# Patient Record
Sex: MALE | Race: WHITE | Employment: FULL TIME | URBAN - METROPOLITAN AREA
[De-identification: names, ages, dates, MRNs, and addresses within clinical notes are randomized per-mention and may not be internally consistent; named-entity substitution may affect disease eponyms.]

---

## 2017-10-30 NOTE — PROGRESS NOTES
"  SUBJECTIVE:   CC: Iván Chirinos is an 31 year old male who presents for preventative health visit.     Healthy Habits:    Do you get at least three servings of calcium containing foods daily (dairy, green leafy vegetables, etc.)? yes and no, taking calcium and/or vitamin D supplement: no    Amount of exercise or daily activities, outside of work: Was going to the gym 3 day(s) per week, has not been doing this for the last few months.    Problems taking medications regularly not applicable    Medication side effects: No    Have you had an eye exam in the past two years? yes    Do you see a dentist twice per year? Has not seen a dentist for \"a while\"    Do you have sleep apnea, excessive snoring or daytime drowsiness? Yes, has a CPAP, has had a sleep study done.           - Patient has concerns with constipation and having blood in his stools. Patient has googled the causes of blood in the stool. Now is having anxiety about possible cancer. Denies having a Fhx of colon cancer.     - Onset beginning of Oct. Patient had first bout of constipation. He does not drink a lot of water daily. He had some bloody stools twice since them.       Today's PHQ-2 Score: PHQ-2 ( 1999 Pfizer) 5/26/2015   Q1: Little interest or pleasure in doing things 0   Q2: Feeling down, depressed or hopeless 0   PHQ-2 Score 0       Abuse: Current or Past(Physical, Sexual or Emotional)- No  Do you feel safe in your environment - Yes    Social History   Substance Use Topics     Smoking status: Never Smoker     Smokeless tobacco: Never Used     Alcohol use Yes          Standardized Alcohol Screening Questionnaire  AUDIT   Questions 0 1 2 3 4 Score   1. How often do you have a drink  containing alcohol? Never Monthly or less 2 to 4  times a  month 2 to 3  times a  week 4 or more  times a  week  4   2. How many drinks containing alcohol  do you have on a typical day when you are drinking? 1 or 2 3 or 4 5 or 6 7 to 9 10 or more  2   3. How often do you " have more than five  or more drinks on one occasion? Never Less  than  monthly Monthly Weekly Daily or  almost  daily  3   4. How often during the last year have  you found that you were not able to stop drinking once you had started? Never Less  than  monthly Monthly Weekly Daily or  almost  daily  0   5. How often during the last year have  you failed to do what was normally expected of you because of drinking? Never Less  than  monthly Monthly Weekly Daily or  almost  daily  0   6. How often during the last year have  you needed a first drink in the morning to get yourself going after a heavy drinking session? Never Less  than  monthly Monthly Weekly Daily or  almost  daily  0   7. How often during the last year have you had a feeling of guilt or remorse after drinking? Never Less  than  monthly Monthly Weekly Daily or  almost  daily  0   8. How often during the last year have  you been unable to remember what happened the night before because of your drinking? Never Less  than  monthly Monthly Weekly Daily or  almost  daily  0   9. Have you or someone else been  injured because of your drinking? No  Yes, but not in the last year  Yes,  during the  last year  0   10. Has a relative, friend, doctor or other health care worker been concerned about your drinking or suggested you cut down? No  Yes, but not in the last year  Yes,  during the  last year  0   Total  9   Scoring: A score of 7 for adult men is an indication of hazardous drinking (risk for physical or physiological harm); a score of 8 or more is an indication of an alcohol use disorder. A score of 5 or more for adult women  is an indication of hazardous drinking or an alcohol use disorder.         Last PSA: No results found for: PSA    Reviewed orders with patient. Reviewed health maintenance and updated orders accordingly - Yes      Reviewed and updated as needed this visit by clinical staff         Reviewed and updated as needed this visit by  "Provider          - Patient uses CPAP machine nightly with no concerns.       ROS:  C: NEGATIVE for fever, chills, change in weight  I: NEGATIVE for worrisome rashes, moles or lesions  E: NEGATIVE for vision changes or irritation  ENT: NEGATIVE for ear, mouth and throat problems  R: NEGATIVE for significant cough or SOB  CV: NEGATIVE for chest pain, palpitations or peripheral edema  GI: POSITIVE for constipation. NEGATIVE for nausea, abdominal pain, heartburn   male: NEGATIVE for dysuria, hematuria, decreased urinary stream, erectile dysfunction, urethral discharge  M: NEGATIVE for significant arthralgias or myalgia  N: NEGATIVE for weakness, dizziness or paresthesias  P: NEGATIVE for changes in mood or affect    This document serves as a record of the services and decisions personally performed and made by Mahsa Beckett MD. It was created on his behalf by Jesus Kumar, a trained medical scribe. The creation of this document is based the provider's statements to the medical scribe.  Jesus Kumar 8:46 AM November 1, 2017  OBJECTIVE:   BP (!) 162/96  Pulse 88  Temp 98.9  F (37.2  C) (Tympanic)  Ht 6' 0.75\" (1.848 m)  Wt (!) 340 lb (154.2 kg)  BMI 45.17 kg/m2       EXAM:  GENERAL: healthy, alert and no distress  EYES: Eyes grossly normal to inspection, conjunctivae and sclerae normal  RESP: lungs clear to auscultation - no rales, rhonchi or wheezes  CV: regular rate and rhythm, normal S1 S2, no murmur  MS: no gross musculoskeletal defects noted, no edema  NEURO: Normal strength and tone, mentation intact and speech normal  PSYCH: mentation appears normal, affect normal/bright      ASSESSMENT/PLAN:     (Z00.00) Routine general medical examination at a health care facility  (primary encounter diagnosis)  Comment: Reviewed lifestyle, current conditions, medications, routine screenings, labs.  Plan: Annual physical in 1 year, sooner for other health maintenance.     (Z13.1) Screening for diabetes " mellitus  Comment: Routine screen, results pending.   Plan: Glucose          (Z13.6) Screening for cardiovascular condition  Comment: Routine screen, results pending.   Plan: Lipid panel reflex to direct LDL Fasting          (E66.01) Morbid obesity (H)  Comment: Discussed benefits of diet and regular exercise.   Plan: Continue to monitor    (G47.33) DESIRAE (obstructive sleep apnea)  Comment: Patient is using CPAP machine nightly. Will check labs, results pending.    Plan: CBC with platelets, TSH with free T4 reflex      (R03.0) Elevated blood pressure reading without diagnosis of hypertension  Comment: lifestyle changes recommended.   Plan: recheck in 3 months.         Patient Instructions     Preventive Health Recommendations  Male Ages 26 - 39      *   Work on regular meals, less carbs, and less processed food, less alcohol.     *   The rectal is okay, drink more water.     *    Use the CPAP consistently.     *    Follow up in 3 months for a weight check.     *    Will check cholesterol, blood sugar, thyroid, and hemoglobin.             COUNSELING:  Reviewed preventive health counseling, as reflected in patient instructions       Regular exercise       Healthy diet/nutrition    BP Screening:   Last 3 BP Readings:    BP Readings from Last 3 Encounters:   11/01/17 (!) 162/96   05/26/15 124/80       The following was recommended to the patient:  Re-screen BP within a year and recommended lifestyle modifications       reports that he has never smoked. He has never used smokeless tobacco.      Estimated body mass index is 42.31 kg/(m^2) as calculated from the following:    Height as of 5/26/15: 6' (1.829 m).    Weight as of 5/26/15: 312 lb (141.5 kg).   Weight management plan: Discussed healthy diet and exercise guidelines and patient will follow up in 12 months in clinic to re-evaluate.    Counseling Resources:  ATP IV Guidelines  Pooled Cohorts Equation Calculator  FRAX Risk Assessment  ICSI Preventive  Guidelines  Dietary Guidelines for Americans, 2010  USDA's MyPlate  ASA Prophylaxis  Lung CA Screening    The information in this document, created by a scribe for me, accurately reflects the services I personally performed and the decisions made by me. I have reviewed and approved this document for accuracy. 8:46 AM 11/1/2017    Mahsa Beckett MD  [facility]  Injectable Influenza Immunization Documentation    1.  Is the person to be vaccinated sick today?   No    2. Does the person to be vaccinated have an allergy to a component   of the vaccine?   No  Egg Allergy Algorithm Link    3. Has the person to be vaccinated ever had a serious reaction   to influenza vaccine in the past?   No    4. Has the person to be vaccinated ever had Guillain-Barré syndrome?   No    Form completed by Sally Rivera CMA

## 2017-10-30 NOTE — PATIENT INSTRUCTIONS
Preventive Health Recommendations  Male Ages 26 - 39      *   Work on regular meals, less carbs, and less processed food, less alcohol.     *   The rectal is okay, drink more water.     *    Use the CPAP consistently.     *    Follow up in 3 months for a weight check.     *    Will check cholesterol, blood sugar, thyroid, and hemoglobin.       Yearly exam:             See your health care provider every year in order to  o   Review health changes.   o   Discuss preventive care.    o   Review your medicines if your doctor has prescribed any.    You should be tested each year for STDs (sexually transmitted diseases), if you re at risk.     After age 35, talk to your provider about cholesterol testing. If you are at risk for heart disease, have your cholesterol tested at least every 5 years.     If you are at risk for diabetes, you should have a diabetes test (fasting glucose).  Shots: Get a flu shot each year. Get a tetanus shot every 10 years.     Nutrition:    Eat at least 5 servings of fruits and vegetables daily.     Eat whole-grain bread, whole-wheat pasta and brown rice instead of white grains and rice.     Talk to your provider about Calcium and Vitamin D.     Lifestyle    Exercise for at least 150 minutes a week (30 minutes a day, 5 days a week). This will help you control your weight and prevent disease.     Limit alcohol to one drink per day.     No smoking.     Wear sunscreen to prevent skin cancer.     See your dentist every six months for an exam and cleaning.

## 2017-11-01 ENCOUNTER — OFFICE VISIT (OUTPATIENT)
Dept: FAMILY MEDICINE | Facility: CLINIC | Age: 32
End: 2017-11-01
Payer: COMMERCIAL

## 2017-11-01 VITALS
WEIGHT: 315 LBS | DIASTOLIC BLOOD PRESSURE: 100 MMHG | HEIGHT: 73 IN | BODY MASS INDEX: 41.75 KG/M2 | TEMPERATURE: 98.9 F | SYSTOLIC BLOOD PRESSURE: 146 MMHG | HEART RATE: 88 BPM

## 2017-11-01 DIAGNOSIS — R03.0 ELEVATED BLOOD PRESSURE READING WITHOUT DIAGNOSIS OF HYPERTENSION: ICD-10-CM

## 2017-11-01 DIAGNOSIS — Z00.00 ROUTINE GENERAL MEDICAL EXAMINATION AT A HEALTH CARE FACILITY: Primary | ICD-10-CM

## 2017-11-01 DIAGNOSIS — Z13.6 SCREENING FOR CARDIOVASCULAR CONDITION: ICD-10-CM

## 2017-11-01 DIAGNOSIS — Z13.1 SCREENING FOR DIABETES MELLITUS: ICD-10-CM

## 2017-11-01 DIAGNOSIS — G47.33 OSA (OBSTRUCTIVE SLEEP APNEA): ICD-10-CM

## 2017-11-01 DIAGNOSIS — Z23 NEED FOR PROPHYLACTIC VACCINATION AND INOCULATION AGAINST INFLUENZA: ICD-10-CM

## 2017-11-01 DIAGNOSIS — E66.01 MORBID OBESITY (H): ICD-10-CM

## 2017-11-01 LAB
CHOLEST SERPL-MCNC: 178 MG/DL
ERYTHROCYTE [DISTWIDTH] IN BLOOD BY AUTOMATED COUNT: 13 % (ref 10–15)
GLUCOSE SERPL-MCNC: 110 MG/DL (ref 70–99)
HCT VFR BLD AUTO: 46.1 % (ref 40–53)
HDLC SERPL-MCNC: 66 MG/DL
HGB BLD-MCNC: 15.6 G/DL (ref 13.3–17.7)
LDLC SERPL CALC-MCNC: 95 MG/DL
MCH RBC QN AUTO: 31.5 PG (ref 26.5–33)
MCHC RBC AUTO-ENTMCNC: 33.8 G/DL (ref 31.5–36.5)
MCV RBC AUTO: 93 FL (ref 78–100)
NONHDLC SERPL-MCNC: 112 MG/DL
PLATELET # BLD AUTO: 295 10E9/L (ref 150–450)
RBC # BLD AUTO: 4.96 10E12/L (ref 4.4–5.9)
TRIGL SERPL-MCNC: 83 MG/DL
TSH SERPL DL<=0.005 MIU/L-ACNC: 0.8 MU/L (ref 0.4–4)
WBC # BLD AUTO: 9.2 10E9/L (ref 4–11)

## 2017-11-01 PROCEDURE — 84443 ASSAY THYROID STIM HORMONE: CPT | Performed by: FAMILY MEDICINE

## 2017-11-01 PROCEDURE — 90471 IMMUNIZATION ADMIN: CPT | Performed by: FAMILY MEDICINE

## 2017-11-01 PROCEDURE — 80061 LIPID PANEL: CPT | Performed by: FAMILY MEDICINE

## 2017-11-01 PROCEDURE — 90686 IIV4 VACC NO PRSV 0.5 ML IM: CPT | Performed by: FAMILY MEDICINE

## 2017-11-01 PROCEDURE — 99395 PREV VISIT EST AGE 18-39: CPT | Mod: 25 | Performed by: FAMILY MEDICINE

## 2017-11-01 PROCEDURE — 85027 COMPLETE CBC AUTOMATED: CPT | Performed by: FAMILY MEDICINE

## 2017-11-01 PROCEDURE — 82947 ASSAY GLUCOSE BLOOD QUANT: CPT | Performed by: FAMILY MEDICINE

## 2017-11-01 PROCEDURE — 36415 COLL VENOUS BLD VENIPUNCTURE: CPT | Performed by: FAMILY MEDICINE

## 2017-11-01 NOTE — LETTER
[facility]  [address] [address]  [phone]       November 2, 2017       Iván Chirinos    [address] [place] [address]      Dear Iván:    Your blood tests were essentially normal. Please see enclosed copies. They show that you have normal thyroid and kidney function. Your cholesterol is acceptable and there is no signs of anemia or infection.    The only concerning test was the fasting blood sugar; it was slightly elevated at 110. At this mild elevation; it's usually not diabetes but a pre diabetes condition that can be reversed. The same healthy life style habits that help with blood pressure, weight and cholesterol management, help lower your blood sugars: exercise, a low fat, low sugar diet, and portion control. We should recheck your fasting blood sugar levels yearly.    Please call with any questions or concerns.                      Sincerely,        Mahsa Beckett MD

## 2017-11-01 NOTE — NURSING NOTE
"Chief Complaint   Patient presents with     Physical       Initial BP (!) 162/96  Pulse 88  Temp 98.9  F (37.2  C) (Tympanic)  Ht 6' 0.75\" (1.848 m)  Wt (!) 340 lb (154.2 kg)  BMI 45.17 kg/m2 Estimated body mass index is 45.17 kg/(m^2) as calculated from the following:    Height as of this encounter: 6' 0.75\" (1.848 m).    Weight as of this encounter: 340 lb (154.2 kg).  Medication Reconciliation: complete     SMA Monica      "

## 2017-11-01 NOTE — MR AVS SNAPSHOT
After Visit Summary   11/1/2017    Iván Chirinos    MRN: [id number]           Patient Information     Date Of Birth          1985        Visit Information        Provider Department      11/1/2017 8:20 AM Mahsa Beckett MD [facility]        Today's Diagnoses     Routine general medical examination at a health care facility    -  1    Screening for diabetes mellitus        Screening for cardiovascular condition        Morbid obesity (H)        DESIRAE (obstructive sleep apnea)          Care Instructions      Preventive Health Recommendations  Male Ages 26 - 39      *   Work on regular meals, less carbs, and less processed food, less alcohol.     *   The rectal is okay, drink more water.     *    Use the CPAP consistently.     *    Follow up in 3 months for a weight check.     *    Will check cholesterol, blood sugar, thyroid, and hemoglobin.       Yearly exam:             See your health care provider every year in order to  o   Review health changes.   o   Discuss preventive care.    o   Review your medicines if your doctor has prescribed any.    You should be tested each year for STDs (sexually transmitted diseases), if you re at risk.     After age 35, talk to your provider about cholesterol testing. If you are at risk for heart disease, have your cholesterol tested at least every 5 years.     If you are at risk for diabetes, you should have a diabetes test (fasting glucose).  Shots: Get a flu shot each year. Get a tetanus shot every 10 years.     Nutrition:    Eat at least 5 servings of fruits and vegetables daily.     Eat whole-grain bread, whole-wheat pasta and brown rice instead of white grains and rice.     Talk to your provider about Calcium and Vitamin D.     Lifestyle    Exercise for at least 150 minutes a week (30 minutes a day, 5 days a week). This will help you control your weight and prevent disease.     Limit alcohol to one drink per day.     No smoking.     Wear  "sunscreen to prevent skin cancer.     See your dentist every six months for an exam and cleaning.             Follow-ups after your visit        Who to contact     Normal or non-critical lab and imaging results will be communicated to you by Big[employer]chart, letter or phone within 4 business days after the clinic has received the results. If you do not hear from us within 7 days, please contact the clinic through BigCal[employer] or phone. If you have a critical or abnormal lab result, we will notify you by phone as soon as possible.  Submit refill requests through [employer] or call your pharmacy and they will forward the refill request to us. Please allow 3 business days for your refill to be completed.          If you need to speak with a  for additional information , please call: [phone]           Additional Information About Your Visit        [employer] Information     [employer] lets you send messages to your doctor, view your test results, renew your prescriptions, schedule appointments and more. To sign up, go to [email or web address] . Click on \"Log in\" on the left side of the screen, which will take you to the Welcome page. Then click on \"Sign up Now\" on the right side of the page.     You will be asked to enter the access code listed below, as well as some personal information. Please follow the directions to create your username and password.     Your access code is: [email or web address]  Expires: 2018  2:00 PM     Your access code will  in 90 days. If you need help or a new code, please call your [facility] clinic or [phone].        Care EveryWhere ID     This is your Care EveryWhere ID. This could be used by other organizations to access your [facility] medical records  [id number]        Your Vitals Were     Pulse Temperature Height BMI (Body Mass Index)          88 98.9  F (37.2  C) (Tympanic) 6' 0.75\" (1.848 m) 45.17 kg/m2         Blood Pressure from Last 3 Encounters:   17 (!) 162/96 "   05/26/15 124/80    Weight from Last 3 Encounters:   11/01/17 (!) 340 lb (154.2 kg)   05/26/15 (!) 312 lb (141.5 kg)              We Performed the Following     CBC with platelets     Glucose     Lipid panel reflex to direct LDL Fasting     TSH with free T4 reflex        Primary Care Provider Office Phone # Fax #    Mahsa Beckett [phone] [address] [place] [address]        Equal Access to Services     COCO CASTELLANO : Hadii aad ku hadasho Soomaali, waaxda luqadaha, qaybta kaalmada adeegyada, waxay idiin hayaan adeeg kharash la'cindy . So [facility] [phone].    ATENCIÓN: Si habla español, tiene a casiano disposición servicios gratuitos de asistencia lingüística. [facility] [phone].    We comply with applicable federal civil rights laws and [place] laws. We do not discriminate on the basis of race, color, national origin, age, disability, sex, sexual orientation, or gender identity.            Thank you!     Thank you for choosing [facility]  for your care. Our goal is always to provide you with excellent care. Hearing back from our patients is one way we can continue to improve our services. Please take a few minutes to complete the written survey that you may receive in the mail after your visit with us. Thank you!             Your Updated Medication List - Protect others around you: Learn how to safely use, store and throw away your medicines at www.disposemymeds.org.          This list is accurate as of: 11/1/17  9:06 AM.  Always use your most recent med list.                   Brand Name Dispense Instructions for use Diagnosis    OMEPRAZOLE PO      Take by mouth daily

## 2017-11-14 PROBLEM — R03.0 ELEVATED BLOOD PRESSURE READING WITHOUT DIAGNOSIS OF HYPERTENSION: Status: ACTIVE | Noted: 2017-11-14

## 2017-11-17 ENCOUNTER — TELEPHONE (OUTPATIENT)
Dept: FAMILY MEDICINE | Facility: CLINIC | Age: 32
End: 2017-11-17

## 2017-11-17 NOTE — TELEPHONE ENCOUNTER
"11/17/2017      Patient Communication Preferences indicate  Do not contact  and/or communication by \"Phone\" is not preferred. Call not required per Outreach team.      Outreach ,  Kailey Levy        "

## 2018-06-21 ENCOUNTER — TELEPHONE (OUTPATIENT)
Dept: FAMILY MEDICINE | Facility: CLINIC | Age: 33
End: 2018-06-21

## 2018-06-21 NOTE — TELEPHONE ENCOUNTER
Called and spoke with patient  He is having anxiety about the on /off rectal bleeding hehas had the past 6 months--was discussed at last appt  Now he has had it twice the past week, denies constipaiton or hard large stools  States he has noted blood-poss small clots  in toilet even when he does not have stool  Denies temp, abd pain, fatigue   Hemoglobin   Date Value Ref Range Status   11/01/2017 15.6 13.3 - 17.7 g/dL Final   ]    No Fx history of colon cancer   Pt does have a lot of anxiety  Advised appt , made for next week  Pt requesting call back for Dr Beckett tomorrow as he is anxious  ETELVINA Carmichael  RN/Quirino Leone

## 2018-06-25 ENCOUNTER — OFFICE VISIT (OUTPATIENT)
Dept: FAMILY MEDICINE | Facility: CLINIC | Age: 33
End: 2018-06-25
Payer: COMMERCIAL

## 2018-06-25 VITALS
SYSTOLIC BLOOD PRESSURE: 142 MMHG | HEART RATE: 82 BPM | HEIGHT: 73 IN | WEIGHT: 315 LBS | BODY MASS INDEX: 41.75 KG/M2 | DIASTOLIC BLOOD PRESSURE: 78 MMHG

## 2018-06-25 DIAGNOSIS — K62.5 BRIGHT RED RECTAL BLEEDING: Primary | ICD-10-CM

## 2018-06-25 DIAGNOSIS — F43.22 ADJUSTMENT DISORDER WITH ANXIOUS MOOD: ICD-10-CM

## 2018-06-25 DIAGNOSIS — R03.0 ELEVATED BLOOD PRESSURE READING WITHOUT DIAGNOSIS OF HYPERTENSION: ICD-10-CM

## 2018-06-25 DIAGNOSIS — E66.01 MORBID OBESITY (H): ICD-10-CM

## 2018-06-25 DIAGNOSIS — G47.33 OSA (OBSTRUCTIVE SLEEP APNEA): ICD-10-CM

## 2018-06-25 PROCEDURE — 99214 OFFICE O/P EST MOD 30 MIN: CPT | Performed by: FAMILY MEDICINE

## 2018-06-25 ASSESSMENT — ANXIETY QUESTIONNAIRES
GAD7 TOTAL SCORE: 14
5. BEING SO RESTLESS THAT IT IS HARD TO SIT STILL: NOT AT ALL
7. FEELING AFRAID AS IF SOMETHING AWFUL MIGHT HAPPEN: SEVERAL DAYS
1. FEELING NERVOUS, ANXIOUS, OR ON EDGE: NEARLY EVERY DAY
6. BECOMING EASILY ANNOYED OR IRRITABLE: NEARLY EVERY DAY
2. NOT BEING ABLE TO STOP OR CONTROL WORRYING: NEARLY EVERY DAY
3. WORRYING TOO MUCH ABOUT DIFFERENT THINGS: NEARLY EVERY DAY

## 2018-06-25 ASSESSMENT — PATIENT HEALTH QUESTIONNAIRE - PHQ9: 5. POOR APPETITE OR OVEREATING: SEVERAL DAYS

## 2018-06-25 NOTE — PROGRESS NOTES
SUBJECTIVE:   Iván Chirinos is a 32 year old male who presents to clinic today for the following health issues:    Constipation     Onset: 2 weeks    Description:   Frequency of bowel movements: daily, 2-3 times.  Stool consistency: soft formed    Progression of Symptoms:  improving    Accompanying Signs & Symptoms:  Abdominal pain (cramping?): YES  Blood in stool: NO - only noticing on toilet paper  Rectal pain: no   Nausea/vomiting: no   Weight loss or gain: YES- loss of about 20 lb since last weight in (This is not intentional weight loss.    History:   History of abdominal surgery: no     Precipitating factors:   Recent use of narcotics, anticholinergics, calcium channel blockers, antacids, or iron supplements: no   Chronic Laxative Use: no          Therapies Tried and outcome: increase in fluids with improvement    Stress and Anxiety related issues  - He notes high stress and high anxiety due to the roommate's history: history of felony.   - Sleeping ok, using CPAP machine.  - Poor appetite and intake.  - Also has spontaneous sweats.  - Poor fluid intake. Has improved in the last week, which has improved his symptoms.   - He tried Zoloft, which did not help, but worsened his symptoms.  - He feels that patient's stress/anxiety is improved with smoking cannabis.     Abnormal Mood Symptoms  Onset: 1 month    Description:   Depression: no  Anxiety: YES, see above.    Accompanying Signs & Symptoms:  Still participating in activities that you used to enjoy: yes  Fatigue: no  Irritability: YES  Difficulty concentrating: no  Changes in appetite: no  Problems with sleep: no  Heart racing/beating fast : YES  Thoughts of hurting yourself or others: none    History:   Recent stress: YES- looking for new home for himself and brother to move, brother with background issues that make this difficult  Prior depression hospitalization: None  Family history of depression: YES- father  Family history of anxiety: YES-  "father    Precipitating factors:   Alcohol/drug use: YES- alcohol and marijuana    Alleviating factors:  None    Therapies Tried and outcome: Zoloft (Sertraline) with side effects    Problem list and histories reviewed & adjusted, as indicated.  Additional history: as documented    BP Readings from Last 3 Encounters:   06/25/18 142/78   11/01/17 (!) 146/100   05/26/15 124/80    Wt Readings from Last 3 Encounters:   06/25/18 146.7 kg (323 lb 6 oz)   11/01/17 (!) 154.2 kg (340 lb)   05/26/15 (!) 141.5 kg (312 lb)         Reviewed and updated as needed this visit by clinical staff  Tobacco  Allergies  Meds  Med Hx  Surg Hx  Fam Hx  Soc Hx    Family history: mother currently diagnosed with breast cancer, treated with chemo.   Reviewed and updated as needed this visit by Provider         ROS:  CONSTITUTIONAL: NEGATIVE for fever, chills, change in weight  ENT/MOUTH: NEGATIVE for ear, mouth and throat problems  RESP: NEGATIVE for significant cough or SOB  CV: NEGATIVE for chest pain, palpitations or peripheral edema    This document serves as a record of the services and decisions personally performed and made by Mahsa Beckett MD. It was created on his behalf by Bella Levy, a trained medical scribe. The creation of this document is based the provider's statements to the medical scribe.    Scribapollo Levy 10:16 AM 6/25/2018      OBJECTIVE:                                                    /78  Pulse 82  Ht 1.848 m (6' 0.75\")  Wt 146.7 kg (323 lb 6 oz)  BMI 42.96 kg/m2  Body mass index is 42.96 kg/(m^2).   GENERAL: healthy, alert, well nourished, well hydrated, no distress  NECK: no tenderness, no adenopathy, no asymmetry, no masses, no stiffness; thyroid- normal to palpation  RESP: lungs clear to auscultation - no rales, no rhonchi, no wheezes  CV: regular rates and rhythm, normal S1 S2  ABDOMEN: soft, no tenderness  PSY: alert, pleasant, mood and affect appropriate.       Diagnostic Test Results:  No " results found for this or any previous visit (from the past 24 hour(s)).     ASSESSMENT/PLAN:                                                      (K62.5) Bright red rectal bleeding  (primary encounter diagnosis)  Comment: Bleeding has resolved since he is increase his water intake and there is been resolution of constipation.  Symptoms seem most consistent with a rectal fissure.  Given his age, low risk for GI malignancy.  Plan: Advised to monitor his rectal bleeding carefully.  Drink more water, consider as needed laxatives.  If the bleeding resolves, no further follow-up needed.  If it persists, I stressed the importance of contacting our clinic, and following up for clinic visit.  Possible surgical referral or colonoscopy.    (E66.01) Morbid obesity (H)  Comment: Body mass index is 42.96 kg/(m^2).  Reviewed lifestyle.    (F43.22) Adjustment disorder with anxious mood  Comment: No improvement in mood using SSRI therapy, if anything, he states symptoms worsen.  Appears to be situational, doubt underlying generalized anxiety disorder.  Plan: Reviewed stress management, regular exercise, meditation.  He is to contact me if symptoms worsen.    (R03.0) Elevated blood pressure reading without diagnosis of hypertension  Comment: Slight systolic elevation.  Plan: Reviewed lifestyle, recheck blood pressure in 3-6 months.    (G47.33) DESIRAE (obstructive sleep apnea)  Comment: Patient states he is using a CPAP consistently, feels refreshed.  Plan: Continue.      Patient will follow up if symptoms worsen or do not improve. Advised to watch for increasing symptoms. Patient instructed to call with any questions or concerns.    Patient Instructions   *    You have time on your side. We don't have to worry about colon cancer. Not to ignore it, but it's lower on the list.     *    If something as simple drinking more water helps, it's nothing to worry about.     *    For the anxiety, let's try non medical treatment. Mediatation, Reyes  Chi. Keep me updated.        The information in this document, created by a scribe for me, accurately reflects the services I personally performed and the decisions made by me. I have reviewed and approved this document for accuracy.

## 2018-06-25 NOTE — MR AVS SNAPSHOT
"              After Visit Summary   6/25/2018    Iván Chirinos    MRN: [id number]           Patient Information     Date Of Birth          1985        Visit Information        Provider Department      6/25/2018 10:00 AM Mahsa Beckett MD [facility]        Care Instructions    *    You have time on your side. We don't have to worry about colon cancer. Not to ignore it, but it's lower on the list.     *    If something as simple drinking more water helps, it's nothing to worry about.     *    For the anxiety, let's try non medical treatment. Mediatation, Tia Chi. Keep me updated.           Follow-ups after your visit        Who to contact     Normal or non-critical lab and imaging results will be communicated to you by MyChart, letter or phone within 4 business days after the clinic has received the results. If you do not hear from us within 7 days, please contact the clinic through MyChart or phone. If you have a critical or abnormal lab result, we will notify you by phone as soon as possible.  Submit refill requests through [employer] or call your pharmacy and they will forward the refill request to us. Please allow 3 business days for your refill to be completed.          If you need to speak with a  for additional information , please call: [phone]           Additional Information About Your Visit        Care EveryWhere ID     This is your Care EveryWhere ID. This could be used by other organizations to access your [place] medical records  [id number]        Your Vitals Were     Pulse Height BMI (Body Mass Index)             82 6' 0.75\" (1.848 m) 42.96 kg/m2          Blood Pressure from Last 3 Encounters:   06/25/18 142/78   11/01/17 (!) 146/100   05/26/15 124/80    Weight from Last 3 Encounters:   06/25/18 323 lb 6 oz (146.7 kg)   11/01/17 (!) 340 lb (154.2 kg)   05/26/15 (!) 312 lb (141.5 kg)              Today, you had the following     No orders found for display "       Primary Care Provider Office Phone # Fax #    Mahsa Becktet [phone] [address] [place] [facility] [address]        Equal Access to Services     JAVIER CASTELLANO : Hadii aad ku hadbertao Viriali, waaxda luqadaha, qaybta kaalmada adeegyada, albina flor laEsteecindy iniguez. So [facility] [phone].    ATENCIÓN: Si habla español, tiene a casiano disposición servicios gratuitos de asistencia lingüística. Llame al [phone].    We comply with applicable federal civil rights laws and [place] laws. We do not discriminate on the basis of race, color, national origin, age, disability, sex, sexual orientation, or gender identity.            Thank you!     Thank you for choosing [facility]  for your care. Our goal is always to provide you with excellent care. Hearing back from our patients is one way we can continue to improve our services. Please take a few minutes to complete the written survey that you may receive in the mail after your visit with us. Thank you!             Your Updated Medication List - Protect others around you: Learn how to safely use, store and throw away your medicines at www.disposemymeds.org.          This list is accurate as of 6/25/18 10:23 AM.  Always use your most recent med list.                   Brand Name Dispense Instructions for use Diagnosis    OMEPRAZOLE PO      Take by mouth daily

## 2018-06-25 NOTE — PATIENT INSTRUCTIONS
*    You have time on your side. We don't have to worry about colon cancer. Not to ignore it, but it's lower on the list.     *    If something as simple drinking more water helps, it's nothing to worry about.     *    For the anxiety, let's try non medical treatment. Mediatation, Tia Chi. Keep me updated.

## 2018-06-26 PROBLEM — K62.5 BRIGHT RED RECTAL BLEEDING: Status: ACTIVE | Noted: 2018-06-26

## 2018-06-26 PROBLEM — E66.01 MORBID OBESITY (H): Status: ACTIVE | Noted: 2018-06-26

## 2018-06-26 ASSESSMENT — ANXIETY QUESTIONNAIRES: GAD7 TOTAL SCORE: 14

## 2018-06-26 ASSESSMENT — PATIENT HEALTH QUESTIONNAIRE - PHQ9: SUM OF ALL RESPONSES TO PHQ QUESTIONS 1-9: 2

## 2018-07-27 ENCOUNTER — TELEPHONE (OUTPATIENT)
Dept: FAMILY MEDICINE | Facility: CLINIC | Age: 33
End: 2018-07-27

## 2018-07-27 NOTE — TELEPHONE ENCOUNTER
Spoke with the patient who says he had drop of blood in stool twice yesterday and once this morning. Denies abdominal pain, no weakness, no external hemorrhoid noted, not feeling faint, no nausea, but reports constipation recently. Says has family history of hemorrhoids and IBS and recently seen by Dr. Beckett in June. Says does not eat well and admits to eating junk food and manzano not make healthy food choices, does not exercise, and does not drink enough water. Reports a lot of stress. Advised patient to follow PCP advise on increasing water, Reyes Chi, advised getting more exercise like walking, encouraged patient to make healthy food choices, start with small changes, advised eating breakfast as patient skips then is starving later. Patient says he will continue to try to make lifestyle changes and was told that if condition worsens and bleeding is excessive, is faint or feels dizzy or lightheaded then seek the ER, otherwise call back with further concerns.    LAYTON Gavin

## 2018-07-27 NOTE — TELEPHONE ENCOUNTER
Patient has medical questions he would like to talk to Dr. Beckett nurse.    Margaret Sun [facility]

## 2019-03-04 NOTE — PROGRESS NOTES
SUBJECTIVE:   CC: Iván Chirinos is an 33 year old male who presents for preventive health visit.     Healthy Habits:    Do you get at least three servings of calcium containing foods daily (dairy, green leafy vegetables, etc.)? yes    Amount of exercise or daily activities, outside of work: None    Problems taking medications regularly not applicable    Medication side effects: No    Have you had an eye exam in the past two years? no    Do you see a dentist twice per year? yes    Do you have sleep apnea, excessive snoring or daytime drowsiness? yes, sleep apnea, he is using CPAP nightly without concerns      Today's PHQ-2 Score:   PHQ-2 ( 1999 [employer]) 3/6/2019 6/25/2018   Q1: Little interest or pleasure in doing things 0 0   Q2: Feeling down, depressed or hopeless 0 0   PHQ-2 Score 0 0       Abuse: Current or Past(Physical, Sexual or Emotional)- No  Do you feel safe in your environment? Yes    Social History     Tobacco Use     Smoking status: Never Smoker     Smokeless tobacco: Never Used   Substance Use Topics     Alcohol use: Yes     If you drink alcohol do you typically have >3 drinks per day or >7 drinks per week? No                      Last PSA: No results found for: PSA    Reviewed orders with patient. Reviewed health maintenance and updated orders accordingly - Yes  Labs reviewed in EPIC    Reviewed and updated as needed this visit by clinical staff  Tobacco  Allergies  Meds  Med Hx  Surg Hx  Fam Hx  Soc Hx        Reviewed and updated as needed this visit by Provider          ROS:  CONSTITUTIONAL: NEGATIVE for fever, chills, change in weight  INTEGUMENTARY/SKIN: NEGATIVE for worrisome rashes, moles or lesions  EYES: NEGATIVE for vision changes or irritation  ENT: NEGATIVE for ear, mouth and throat problems  RESP: POSITIVE for Hx of DESIRAE  CV: NEGATIVE for chest pain, palpitations or peripheral edema  GI: POSITIVE for Hx of GERD   male: negative for dysuria, hematuria, decreased urinary stream,  "erectile dysfunction, urethral discharge  MUSCULOSKELETAL: NEGATIVE for significant arthralgias or myalgia  NEURO: NEGATIVE for weakness, dizziness or paresthesias  PSYCHIATRIC: NEGATIVE for changes in mood or affect    This document serves as a record of the services and decisions personally performed and made by Mahsa Beckett MD. It was created on his behalf by Petar Dolan, a trained medical scribe. The creation of this document is based the provider's statements to the medical scribe.  Petar Dolan 11:47 AM March 6, 2019    OBJECTIVE:   /70   Pulse 80   Temp 98.3  F (36.8  C) (Tympanic)   Resp 20   Ht 1.848 m (6' 0.75\")   Wt 146.6 kg (323 lb 4 oz)   BMI 42.94 kg/m    EXAM:  GENERAL: healthy, alert and no distress  EYES: Eyes grossly normal to inspection, conjunctivae and sclerae normal  HENT: ear canals and TM's normal, nose and mouth without ulcers or lesions  NECK: no adenopathy, no asymmetry, masses, or scars and thyroid normal to palpation  RESP: lungs clear to auscultation - no rales, rhonchi or wheezes  CV: regular rate and rhythm, normal S1 S2  ABDOMEN: soft, nontender  MS: no gross musculoskeletal defects noted, no edema  SKIN: no suspicious lesions or rashes  NEURO: Normal strength and tone, mentation intact and speech normal  PSYCH: mentation appears normal, affect normal/bright    Results for orders placed or performed in visit on 03/06/19   Lipid panel reflex to direct LDL Fasting   Result Value Ref Range    Cholesterol 207 (H) <200 mg/dL    Triglycerides 97 <150 mg/dL    HDL Cholesterol 66 >39 mg/dL    LDL Cholesterol Calculated 122 (H) <100 mg/dL    Non HDL Cholesterol 141 (H) <130 mg/dL   Basic metabolic panel   Result Value Ref Range    Sodium 139 133 - 144 mmol/L    Potassium 4.3 3.4 - 5.3 mmol/L    Chloride 104 94 - 109 mmol/L    Carbon Dioxide 27 20 - 32 mmol/L    Anion Gap 8 3 - 14 mmol/L    Glucose 98 70 - 99 mg/dL    Urea Nitrogen 11 7 - 30 mg/dL    Creatinine 0.69 0.66 - 1.25 " "mg/dL    GFR Estimate >90 >60 mL/min/[1.73_m2]    GFR Estimate If Black >90 >60 mL/min/[1.73_m2]    Calcium 9.4 8.5 - 10.1 mg/dL   TSH with free T4 reflex   Result Value Ref Range    TSH 0.53 0.40 - 4.00 mU/L   Hemoglobin A1c   Result Value Ref Range    Hemoglobin A1C 5.6 0 - 5.6 %       ASSESSMENT/PLAN:   (Z00.00) Routine general medical examination at a health care facility  (primary encounter diagnosis)  Comment: Reviewed lifestyle, current conditions, medications, routine screening and labs.  Plan: Annual physical in 1 year, sooner for other health maintenance.    (E66.01) Morbid obesity (H)  Comment: Reviewed lifestyle modifications, exercise and diet.  No signs of insulin resistance.  Normal thyroid function.  Plan: TSH with free T4 reflex, Hemoglobin A1c      (R03.0) Elevated blood pressure reading without diagnosis of hypertension  Comment: Improved.  Normal renal function.  Plan: Basic metabolic panel        Recheck yearly.    (G47.33) DESIRAE (obstructive sleep apnea)  Comment: Reviewed the importance of consistent nightly use of CPAP  Plan: continue nightly use    (Z13.6) CARDIOVASCULAR SCREENING; LDL GOAL LESS THAN 160  Comment: Preventative screening  Plan: Lipid panel reflex to direct LDL Fasting          Patient Instructions     *   More water, less caffeine.     *   Work on the weight.     *   Will check cholesterol, kidney function, blood sugar.       COUNSELING:  Reviewed preventive health counseling, as reflected in patient instructions    BP Readings from Last 1 Encounters:   03/06/19 120/70     Estimated body mass index is 42.94 kg/m  as calculated from the following:    Height as of this encounter: 1.848 m (6' 0.75\").    Weight as of this encounter: 146.6 kg (323 lb 4 oz).      Weight management plan: Discussed healthy diet and exercise guidelines     reports that  has never smoked. he has never used smokeless tobacco.      Counseling Resources:  ATP IV Guidelines  Pooled Cohorts Equation " Calculator  FRAX Risk Assessment  ICSI Preventive Guidelines  Dietary Guidelines for Americans, 2010  USDA's MyPlate  ASA Prophylaxis  Lung CA Screening    The information in this document, created by a scribe for me, accurately reflects the services I personally performed and the decisions made by me. I have reviewed and approved this document for accuracy.     Mahsa Beckett MD  [facility]

## 2019-03-04 NOTE — PATIENT INSTRUCTIONS
Preventive Health Recommendations  Male Ages 26 - 39    *   More water, less caffeine.     *   Work on the weight.     *   Will check cholesterol, kidney function, blood sugar.       Yearly exam:             See your health care provider every year in order to  o   Review health changes.   o   Discuss preventive care.    o   Review your medicines if your doctor has prescribed any.    You should be tested each year for STDs (sexually transmitted diseases), if you re at risk.     After age 35, talk to your provider about cholesterol testing. If you are at risk for heart disease, have your cholesterol tested at least every 5 years.     If you are at risk for diabetes, you should have a diabetes test (fasting glucose).  Shots: Get a flu shot each year. Get a tetanus shot every 10 years.     Nutrition:    Eat at least 5 servings of fruits and vegetables daily.     Eat whole-grain bread, whole-wheat pasta and brown rice instead of white grains and rice.     Get adequate Calcium and Vitamin D.     Lifestyle    Exercise for at least 150 minutes a week (30 minutes a day, 5 days a week). This will help you control your weight and prevent disease.     Limit alcohol to one drink per day.     No smoking.     Wear sunscreen to prevent skin cancer.     See your dentist every six months for an exam and cleaning.

## 2019-03-06 ENCOUNTER — OFFICE VISIT (OUTPATIENT)
Dept: FAMILY MEDICINE | Facility: CLINIC | Age: 34
End: 2019-03-06
Payer: COMMERCIAL

## 2019-03-06 VITALS
BODY MASS INDEX: 41.75 KG/M2 | HEIGHT: 73 IN | SYSTOLIC BLOOD PRESSURE: 120 MMHG | WEIGHT: 315 LBS | RESPIRATION RATE: 20 BRPM | HEART RATE: 80 BPM | TEMPERATURE: 98.3 F | DIASTOLIC BLOOD PRESSURE: 70 MMHG

## 2019-03-06 DIAGNOSIS — E66.01 MORBID OBESITY (H): ICD-10-CM

## 2019-03-06 DIAGNOSIS — Z13.6 CARDIOVASCULAR SCREENING; LDL GOAL LESS THAN 160: ICD-10-CM

## 2019-03-06 DIAGNOSIS — Z00.00 ROUTINE GENERAL MEDICAL EXAMINATION AT A HEALTH CARE FACILITY: Primary | ICD-10-CM

## 2019-03-06 DIAGNOSIS — G47.33 OSA (OBSTRUCTIVE SLEEP APNEA): ICD-10-CM

## 2019-03-06 DIAGNOSIS — R03.0 ELEVATED BLOOD PRESSURE READING WITHOUT DIAGNOSIS OF HYPERTENSION: ICD-10-CM

## 2019-03-06 LAB
ANION GAP SERPL CALCULATED.3IONS-SCNC: 8 MMOL/L (ref 3–14)
BUN SERPL-MCNC: 11 MG/DL (ref 7–30)
CALCIUM SERPL-MCNC: 9.4 MG/DL (ref 8.5–10.1)
CHLORIDE SERPL-SCNC: 104 MMOL/L (ref 94–109)
CHOLEST SERPL-MCNC: 207 MG/DL
CO2 SERPL-SCNC: 27 MMOL/L (ref 20–32)
CREAT SERPL-MCNC: 0.69 MG/DL (ref 0.66–1.25)
GFR SERPL CREATININE-BSD FRML MDRD: >90 ML/MIN/{1.73_M2}
GLUCOSE SERPL-MCNC: 98 MG/DL (ref 70–99)
HBA1C MFR BLD: 5.6 % (ref 0–5.6)
HDLC SERPL-MCNC: 66 MG/DL
LDLC SERPL CALC-MCNC: 122 MG/DL
NONHDLC SERPL-MCNC: 141 MG/DL
POTASSIUM SERPL-SCNC: 4.3 MMOL/L (ref 3.4–5.3)
SODIUM SERPL-SCNC: 139 MMOL/L (ref 133–144)
TRIGL SERPL-MCNC: 97 MG/DL
TSH SERPL DL<=0.005 MIU/L-ACNC: 0.53 MU/L (ref 0.4–4)

## 2019-03-06 PROCEDURE — 80048 BASIC METABOLIC PNL TOTAL CA: CPT | Performed by: FAMILY MEDICINE

## 2019-03-06 PROCEDURE — 80061 LIPID PANEL: CPT | Performed by: FAMILY MEDICINE

## 2019-03-06 PROCEDURE — 83036 HEMOGLOBIN GLYCOSYLATED A1C: CPT | Performed by: FAMILY MEDICINE

## 2019-03-06 PROCEDURE — 36415 COLL VENOUS BLD VENIPUNCTURE: CPT | Performed by: FAMILY MEDICINE

## 2019-03-06 PROCEDURE — 84443 ASSAY THYROID STIM HORMONE: CPT | Performed by: FAMILY MEDICINE

## 2019-03-06 PROCEDURE — 99395 PREV VISIT EST AGE 18-39: CPT | Performed by: FAMILY MEDICINE

## 2019-03-06 ASSESSMENT — PAIN SCALES - GENERAL: PAINLEVEL: NO PAIN (0)

## 2019-03-06 ASSESSMENT — MIFFLIN-ST. JEOR: SCORE: 2451.16
